# Patient Record
Sex: FEMALE | Race: WHITE | NOT HISPANIC OR LATINO | Employment: OTHER | URBAN - NONMETROPOLITAN AREA
[De-identification: names, ages, dates, MRNs, and addresses within clinical notes are randomized per-mention and may not be internally consistent; named-entity substitution may affect disease eponyms.]

---

## 2020-08-12 ENCOUNTER — HOSPITAL ENCOUNTER (EMERGENCY)
Facility: HOSPITAL | Age: 55
End: 2020-08-12
Attending: FAMILY MEDICINE
Payer: MEDICAID

## 2020-08-12 VITALS
OXYGEN SATURATION: 97 % | DIASTOLIC BLOOD PRESSURE: 62 MMHG | HEART RATE: 101 BPM | HEIGHT: 63 IN | SYSTOLIC BLOOD PRESSURE: 131 MMHG | RESPIRATION RATE: 18 BRPM | TEMPERATURE: 98 F | BODY MASS INDEX: 40.75 KG/M2 | WEIGHT: 230 LBS

## 2020-08-12 DIAGNOSIS — M25.50 POLYARTHRALGIA: ICD-10-CM

## 2020-08-12 DIAGNOSIS — M25.562 CHRONIC PAIN OF LEFT KNEE: Primary | ICD-10-CM

## 2020-08-12 DIAGNOSIS — G89.29 CHRONIC PAIN OF LEFT KNEE: Primary | ICD-10-CM

## 2020-08-12 LAB
ALBUMIN SERPL BCP-MCNC: 2.1 G/DL (ref 3.5–5.2)
ALP SERPL-CCNC: 147 U/L (ref 55–135)
ALT SERPL W/O P-5'-P-CCNC: 52 U/L (ref 10–44)
ANION GAP SERPL CALC-SCNC: 15 MMOL/L (ref 8–16)
AST SERPL-CCNC: 76 U/L (ref 10–40)
BASOPHILS # BLD AUTO: ABNORMAL K/UL (ref 0–0.2)
BASOPHILS NFR BLD: 0 % (ref 0–1.9)
BILIRUB SERPL-MCNC: 2 MG/DL (ref 0.1–1)
BUN SERPL-MCNC: 28 MG/DL (ref 6–20)
CALCIUM SERPL-MCNC: 9.4 MG/DL (ref 8.7–10.5)
CHLORIDE SERPL-SCNC: 99 MMOL/L (ref 95–110)
CO2 SERPL-SCNC: 21 MMOL/L (ref 23–29)
CREAT SERPL-MCNC: 1.2 MG/DL (ref 0.5–1.4)
DIFFERENTIAL METHOD: ABNORMAL
EOSINOPHIL # BLD AUTO: ABNORMAL K/UL (ref 0–0.5)
EOSINOPHIL NFR BLD: 0 % (ref 0–8)
ERYTHROCYTE [DISTWIDTH] IN BLOOD BY AUTOMATED COUNT: 14.7 % (ref 11.5–14.5)
EST. GFR  (AFRICAN AMERICAN): 59.2 ML/MIN/1.73 M^2
EST. GFR  (NON AFRICAN AMERICAN): 51.4 ML/MIN/1.73 M^2
GLUCOSE SERPL-MCNC: 56 MG/DL (ref 70–110)
HCT VFR BLD AUTO: 46.9 % (ref 37–48.5)
HGB BLD-MCNC: 15.2 G/DL (ref 12–16)
IMM GRANULOCYTES # BLD AUTO: ABNORMAL K/UL (ref 0–0.04)
IMM GRANULOCYTES NFR BLD AUTO: ABNORMAL % (ref 0–0.5)
LYMPHOCYTES # BLD AUTO: ABNORMAL K/UL (ref 1–4.8)
LYMPHOCYTES NFR BLD: 9 % (ref 18–48)
MCH RBC QN AUTO: 29.5 PG (ref 27–31)
MCHC RBC AUTO-ENTMCNC: 32.4 G/DL (ref 32–36)
MCV RBC AUTO: 91 FL (ref 82–98)
METAMYELOCYTES NFR BLD MANUAL: 3 %
MONOCYTES # BLD AUTO: ABNORMAL K/UL (ref 0.3–1)
MONOCYTES NFR BLD: 5 % (ref 4–15)
MYELOCYTES NFR BLD MANUAL: 2 %
NEUTROPHILS NFR BLD: 59 % (ref 38–73)
NEUTS BAND NFR BLD MANUAL: 22 %
NRBC BLD-RTO: 0 /100 WBC
PLATELET # BLD AUTO: 73 K/UL (ref 150–350)
PMV BLD AUTO: 11.5 FL (ref 9.2–12.9)
POTASSIUM SERPL-SCNC: 4.1 MMOL/L (ref 3.5–5.1)
PROT SERPL-MCNC: 6.4 G/DL (ref 6–8.4)
RBC # BLD AUTO: 5.16 M/UL (ref 4–5.4)
SODIUM SERPL-SCNC: 135 MMOL/L (ref 136–145)
URATE SERPL-MCNC: 5.5 MG/DL (ref 2.4–5.7)
WBC # BLD AUTO: 11.72 K/UL (ref 3.9–12.7)

## 2020-08-12 PROCEDURE — 36415 COLL VENOUS BLD VENIPUNCTURE: CPT

## 2020-08-12 PROCEDURE — 25000003 PHARM REV CODE 250: Performed by: FAMILY MEDICINE

## 2020-08-12 PROCEDURE — 85027 COMPLETE CBC AUTOMATED: CPT

## 2020-08-12 PROCEDURE — 99284 EMERGENCY DEPT VISIT MOD MDM: CPT | Mod: 25

## 2020-08-12 PROCEDURE — 85007 BL SMEAR W/DIFF WBC COUNT: CPT

## 2020-08-12 PROCEDURE — 80053 COMPREHEN METABOLIC PANEL: CPT

## 2020-08-12 PROCEDURE — 84550 ASSAY OF BLOOD/URIC ACID: CPT

## 2020-08-12 RX ORDER — NAPROXEN 500 MG/1
500 TABLET ORAL 2 TIMES DAILY WITH MEALS
Qty: 14 TABLET | Refills: 0 | Status: SHIPPED | OUTPATIENT
Start: 2020-08-12

## 2020-08-12 RX ORDER — HYDROCODONE BITARTRATE AND ACETAMINOPHEN 7.5; 325 MG/1; MG/1
1 TABLET ORAL EVERY 6 HOURS PRN
COMMUNITY

## 2020-08-12 RX ORDER — OXYCODONE AND ACETAMINOPHEN 5; 325 MG/1; MG/1
1 TABLET ORAL
Status: COMPLETED | OUTPATIENT
Start: 2020-08-12 | End: 2020-08-12

## 2020-08-12 RX ADMIN — OXYCODONE HYDROCHLORIDE AND ACETAMINOPHEN 1 TABLET: 5; 325 TABLET ORAL at 07:08

## 2020-08-12 NOTE — ED PROVIDER NOTES
Encounter Date: 8/12/2020       History     Chief Complaint   Patient presents with    Knee Pain     left knee pain for 2 weeks, doctor thinks it's gout     Patient is a 54-year-old female complaining of left knee pain x2 weeks.  There has not been any injury.  She is questioning gout.  She also complains of other joints being swollen and inflamed.  She currently does not have a PCP.  She has not seen an orthopedist or rheumatologist.  She denies any fever.        Review of patient's allergies indicates:   Allergen Reactions    Codeine Itching     Past Medical History:   Diagnosis Date    Diabetes mellitus     Gout     Hepatitis C      Past Surgical History:   Procedure Laterality Date    HYSTERECTOMY      TONSILLECTOMY       History reviewed. No pertinent family history.  Social History     Tobacco Use    Smoking status: Current Every Day Smoker     Packs/day: 0.50     Types: Cigarettes   Substance Use Topics    Alcohol use: Not Currently    Drug use: Not on file     Review of Systems   Constitutional: Negative for fever.   HENT: Negative for sore throat.    Respiratory: Negative for shortness of breath.    Cardiovascular: Negative for chest pain.   Gastrointestinal: Negative for nausea.   Genitourinary: Negative for dysuria.   Musculoskeletal: Positive for arthralgias. Negative for back pain.   Skin: Negative for rash.   Neurological: Negative for weakness.   Hematological: Does not bruise/bleed easily.   All other systems reviewed and are negative.      Physical Exam     Initial Vitals [08/12/20 0707]   BP Pulse Resp Temp SpO2   (!) 115/51 110 18 97.9 °F (36.6 °C) 97 %      MAP       --         Physical Exam    Nursing note and vitals reviewed.  Constitutional: She appears well-developed.   HENT:   Head: Normocephalic and atraumatic.   Eyes: EOM are normal. Pupils are equal, round, and reactive to light.   Neck: Normal range of motion. Neck supple.   Cardiovascular: Normal rate, regular rhythm and  normal heart sounds.   Pulmonary/Chest: Breath sounds normal. No respiratory distress.   Abdominal: Soft. Bowel sounds are normal. She exhibits no distension. There is no abdominal tenderness. There is no rebound.   Musculoskeletal: Normal range of motion.      Comments: Left knee is tender to palpation especially along the lateral joint line.  There is no obvious effusion.  There is pain with flexion and extension.  Joint does not appear to be unstable.   Neurological: She is alert and oriented to person, place, and time. No cranial nerve deficit.   Skin: Skin is warm and dry. Capillary refill takes less than 2 seconds.   Psychiatric: She has a normal mood and affect. Her behavior is normal. Thought content normal.         ED Course   Procedures  Labs Reviewed   CBC W/ AUTO DIFFERENTIAL - Abnormal; Notable for the following components:       Result Value    RDW 14.7 (*)     Platelets 73 (*)     Lymph% 9.0 (*)     All other components within normal limits   COMPREHENSIVE METABOLIC PANEL - Abnormal; Notable for the following components:    Sodium 135 (*)     CO2 21 (*)     Glucose 56 (*)     BUN, Bld 28 (*)     Albumin 2.1 (*)     Total Bilirubin 2.0 (*)     Alkaline Phosphatase 147 (*)     AST 76 (*)     ALT 52 (*)     eGFR if  59.2 (*)     eGFR if non  51.4 (*)     All other components within normal limits   URIC ACID          Imaging Results          X-Ray Knee 1 or 2 View Left (Final result)  Result time 08/12/20 08:27:27    Final result by Edy Madden MD (08/12/20 08:27:27)                 Impression:      1. Medial degenerative change.  No acute osseous abnormality.  2. BB within the inferior popliteal fossa.      Electronically signed by: Edy Madden MD  Date:    08/12/2020  Time:    08:27             Narrative:    EXAMINATION:  XR KNEE 1 OR 2 VIEW LEFT    CLINICAL HISTORY:  pain;    COMPARISON:  None    FINDINGS:  No fracture or dislocation.  Moderate medial joint space  narrowing.  Superior patellar enthesophyte is present.  There is a BB within the inferior popliteal fossa.                              X-Rays:   Independently Interpreted Readings:   Other Readings:  There is a BB foreign body in the left knee.  Otherwise unremarkable.    Medical Decision Making:   Initial Assessment:   54-year-old female complaining of left knee pain and inflammation of the joints.  Patient does have a history of insulin-dependent diabetes.  Differential Diagnosis:   Polyarthralgias, left knee strain, ligamentous injury.  Clinical Tests:   Lab Tests: Ordered  Radiological Study: Ordered  ED Management:  Uric acid level is within normal limits.  Cause of patient's symptoms is unclear.  Patient was recommended to see a rheumatoid.  She was also advised to get a primary care physician.                   ED Course as of Aug 12 0913   Wed Aug 12, 2020   0832 Await labs.     [RB]      ED Course User Index  [RB] Flip Majano Jr., MD                Clinical Impression:       ICD-10-CM ICD-9-CM   1. Chronic pain of left knee  M25.562 719.46    G89.29 338.29   2. Polyarthralgia  M25.50 719.49         Disposition:   Disposition: Discharged  Condition: Stable     ED Disposition Condition    Discharge Stable        ED Prescriptions     Medication Sig Dispense Start Date End Date Auth. Provider    naproxen (NAPROSYN) 500 MG tablet Take 1 tablet (500 mg total) by mouth 2 (two) times daily with meals. 14 tablet 8/12/2020  Flip Majano Jr., MD        Follow-up Information     Follow up With Specialties Details Why Contact Info    [facility]  In 1 week  [address]  [facility] [address]  [phone]                                     Flip Majano Jr., MD  08/12/20 0942